# Patient Record
Sex: MALE | Race: WHITE | NOT HISPANIC OR LATINO | Employment: FULL TIME | URBAN - METROPOLITAN AREA
[De-identification: names, ages, dates, MRNs, and addresses within clinical notes are randomized per-mention and may not be internally consistent; named-entity substitution may affect disease eponyms.]

---

## 2018-08-10 ENCOUNTER — APPOINTMENT (OUTPATIENT)
Dept: RADIOLOGY | Facility: MEDICAL CENTER | Age: 28
End: 2018-08-10
Attending: EMERGENCY MEDICINE

## 2018-08-10 ENCOUNTER — HOSPITAL ENCOUNTER (EMERGENCY)
Facility: MEDICAL CENTER | Age: 28
End: 2018-08-10
Attending: EMERGENCY MEDICINE

## 2018-08-10 VITALS
RESPIRATION RATE: 16 BRPM | HEART RATE: 75 BPM | SYSTOLIC BLOOD PRESSURE: 109 MMHG | OXYGEN SATURATION: 97 % | TEMPERATURE: 98.3 F | WEIGHT: 155.2 LBS | DIASTOLIC BLOOD PRESSURE: 69 MMHG

## 2018-08-10 DIAGNOSIS — S30.0XXA CONTUSION OF COCCYX, INITIAL ENCOUNTER: ICD-10-CM

## 2018-08-10 PROCEDURE — 700102 HCHG RX REV CODE 250 W/ 637 OVERRIDE(OP): Performed by: EMERGENCY MEDICINE

## 2018-08-10 PROCEDURE — A9270 NON-COVERED ITEM OR SERVICE: HCPCS | Performed by: EMERGENCY MEDICINE

## 2018-08-10 PROCEDURE — 99283 EMERGENCY DEPT VISIT LOW MDM: CPT

## 2018-08-10 PROCEDURE — 72220 X-RAY EXAM SACRUM TAILBONE: CPT

## 2018-08-10 RX ORDER — IBUPROFEN 600 MG/1
600 TABLET ORAL ONCE
Status: COMPLETED | OUTPATIENT
Start: 2018-08-10 | End: 2018-08-10

## 2018-08-10 RX ORDER — ACETAMINOPHEN 325 MG/1
650 TABLET ORAL ONCE
Status: COMPLETED | OUTPATIENT
Start: 2018-08-10 | End: 2018-08-10

## 2018-08-10 RX ADMIN — IBUPROFEN 600 MG: 600 TABLET, FILM COATED ORAL at 12:35

## 2018-08-10 RX ADMIN — ACETAMINOPHEN 650 MG: 325 TABLET, FILM COATED ORAL at 12:36

## 2018-08-10 NOTE — ED NOTES
Pt provided with discharge instructions, instructions for follow up appointment with Workman's Comp, s/s of when to seek emergency care.  Pt verbalizes understanding.  Pt discharged in good condition.

## 2018-08-10 NOTE — ED PROVIDER NOTES
ED Provider Note    Scribed for Martin Cornejo M.D. by Natalie Sampson. 8/10/2018  12:02 PM    Means of arrival: walk in   History obtained from: patient   History limited by: none     CHIEF COMPLAINT  Chief Complaint   Patient presents with   • T-5000 FALL   • Tailbone Pain       HPI  Juanito Quinones is a 27 y.o. male who presents to the Emergency Department for evaluation of coccyx pain secondary to a fall yesterday. Patient reports falling from a 5 foot ladder yesterday while at work. His pain is exacerbated with palpation. Patient did not come in yesterday as he hoped his pain would resolve on it's own. He has not taken anything for pain control. No complaints of pain with bowel movements and syncope.    REVIEW OF SYSTEMS  Pertinent positives include coccyx pain. Pertinent negatives include no pain with bowel movements, syncope.  E.     PAST MEDICAL HISTORY   No pertinent past medical history.     SURGICAL HISTORY  patient denies any surgical history    SOCIAL HISTORY  Social History   Substance Use Topics   • Smoking status: None noted    • Smokeless tobacco: None noted    • Alcohol use None noted       History   Drug use: Unknown       FAMILY HISTORY  No family history noted    CURRENT MEDICATIONS  Current medications can be reviewed in the nurse's note.     ALLERGIES  No Known Allergies    PHYSICAL EXAM  VITAL SIGNS: /69   Pulse 75   Temp 36.8 °C (98.3 °F)   Resp 16   Wt 70.4 kg (155 lb 3.3 oz)   SpO2 97%     Constitutional: Well developed, Well nourished, no distress, Non-toxic appearance.   HENT: Normocephalic, Atraumatic.  Oropharynx moist.   Eyes: PERRL, EOMI, Conjunctiva normal, No discharge.   Neck: no anterior cervical lymphadenopathy  CV: Good pulses  Thorax & Lungs: No respiratory distress.   Skin: Warm, Dry, No erythema, No rash.    Musculoskeletal: Focal pain to palpation of the sacrum and coccyx. Patient is ambulatory with normal gait.   Neurologic: Awake, alert. Moves all extremities  spontaneously. Patient is ambulatory with normal gait.   Psychiatric: Affect normal, Mood normal.     RADIOLOGY  DX-SACRUM AND COCCYX 2+   Final Result      No displaced fracture of sacrum or coccyx.        The radiologist's interpretation of all radiological studies have been reviewed by me.    COURSE & MEDICAL DECISION MAKING  Pertinent Labs & Imaging studies reviewed. (See chart for details)    12:02 PM - Patient seen and examined at bedside. Patient will be treated with Motrin 600 mg, Tylenol 350 mg. Ordered DX sacrum and Coccyx to evaluate his symptoms.     1:02 PM- Reviewed the patient's imaging results which were negative for fracture.     1:06 PM- Patient was reevaluated at bedside. He is feeling improved. Discussed radiology results with the patient and informed them that he is stable for discharge. Encouraged him to follow up with workman's compensation. Instructed the patient on return to ED precautions and he agrees to be discharged home.      Decision Making:  Fall Kocsis contusion, x-rays negative, will discharge patient home, have the patient follow-up the Worker's Comp., have the patient return with any other concerns      The patient will return for new or worsening symptoms and is stable at the time of discharge.    The patient is referred to a primary physician for blood pressure management, diabetic screening, and for all other preventative health concerns.    DISPOSITION:  Patient will be discharged home in stable condition.    FOLLOW UP:  [facility], Emergency Dept  [address]  [place] [address] [phone]    If symptoms worsen    [facility] [place] [id number]  [address]  [facility] [place] [id number] [phone]        FINAL IMPRESSION  1. Contusion of coccyx, initial encounter        Natalie BLUE (Gloria), am scribing for, and in the presence of, Martin Cornejo M.D..    Electronically signed by: Natalie Sampson (Gloria), 8/10/2018    SU  Martin Cornejo M.D. personally performed the services described in this documentation, as scribed by Natalie Sampson in my presence, and it is both accurate and complete.    The note accurately reflects work and decisions made by me.  Martin Cornejo  8/10/2018  4:28 PM

## 2018-08-10 NOTE — LETTER
FORM C-4:  EMPLOYEE’S CLAIM FOR COMPENSATION/ REPORT OF INITIAL TREATMENT  EMPLOYEE’S CLAIM - PROVIDE ALL INFORMATION REQUESTED   First Name  Juanito Last Name  Joni Birthdate             Age  1990 27 y.o. Sex  male Claim Number   [address] Employee Address  [address]                                     [place]  [address] [place]    Weight  70.4 kg (155 lb 3.3 oz) [facility]     Mailing Employee Address                           [address]   [facility]               Zip  [address] Telephone  [phone] (home)  Primary Language Spoken  ENGLISH   Insurer  [employer] [facility] Third Party   [employer] [facility] Employee's Occupation (Job Title) When Injury or Occupational Disease Occurred     Employer's Name  [employer] Telephone  [phone]    Employer Address  [address] [place] [facility] [place]  [address]   Date of Injury  8/10/2018       Hour of Injury  10:00 AM Date Employer Notified  8/10/2018 Last Day of Work after Injury or Occupational Disease  8/10/2018 Supervisor to Whom Injury Reported  Phong   Address or Location of Accident (if applicable)  [address]   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 Be specific and answer in detail. Use additional sheet if necessary)  I was on top of the ladder when the air hose it and I fell off   If you believe that you have an occupational disease, when did you first have knowledge of the disability and it relationship to your employment?  N/A Witnesses to the Accident  [place]     Nature of Injury or Occupational Disease  Workers' Compensation  Part(s) of Body Injured or Affected  Buttocks, Spinal Cord - Trunk, N/A    I certify that the above is true and correct to the best of my knowledge and that I have provided this information in order to obtain the benefits of [facility]  Industrial Insurance and Occupational Diseases [place]).  I hereby authorize any physician, chiropractor, surgeon, practitioner, or other person, any hospital, including [facility], any medical service organization, any insurance company, or other institution or organization to release to each other, any medical or other information, including benefits paid or payable, pertinent to this injury or disease, except information relative to diagnosis, treatment and/or counseling for AIDS, psychological conditions, alcohol or controlled substances, for which I must give specific authorization.  A Photostat of this authorization shall be as valid as the original.   Date  8/10/18 Place  [facility] Employee’s Signature   THIS REPORT MUST BE COMPLETED AND MAILED WITHIN 3 WORKING DAYS OF TREATMENT   Place  [facility], EMERGENCY DEPT  Name of Facility   [facility]   Date  8/10/2018 Diagnosis  (S30.0XXA) Contusion of coccyx, initial encounter Is there evidence the injured employee was under the influence of alcohol and/or another controlled substance at the time of accident?   Hour  2:02 PM Description of Injury or Disease  Contusion of coccyx, initial encounter No   Treatment  nsaids  Have you advised the patient to remain off work five days or more?         No   X-Ray Findings  Negative   If Yes   From Date    To Date      From information given by the employee, together with medical evidence, can you directly connect this injury or occupational disease as job incurred?  Yes If No, is the employee capable of: Full Duty  No Modified Duty  Yes   Is additional medical care by a physician indicated?  Yes If Modified Duty, Specify any Limitations / Restrictions  No lifting greater than 25 lbs     Do you know of any previous injury or disease contributing to this condition or occupational disease?  No  "  Date  8/10/2018 Print Doctor’s Name  Martin Cornejo I certify the employer’s copy of this form was mailed on:   Address  [address] [address] [phone] Insurer’s Use Only   [facility]  [address]    Provider’s Tax ID Number  [id number] Telephone  Dept: [phone]    Doctor’s Signature  e-MARTIN Carter M.D. Degree   MD    Original - TREATING PHYSICIAN OR CHIROPRACTOR   Pg 2-Insurer/TPA   Pg 3-Employer   Pg 4-Employee                                                                                                  Form C-4 (rev01/03)     BRIEF DESCRIPTION OF RIGHTS AND BENEFITS  (Pursuant to [place] 616[place])    Notice of Injury or Occupational Disease (Incident Report Form C-1): If an injury or occupational disease (OD) arises out of and in the course of employment, you must provide written notice to your employer as soon as practicable, but no later than 7 days after the accident or OD. Your employer shall maintain a sufficient supply of the required forms.    Claim for Compensation (Form C-4): If medical treatment is sought, the form C-4 is available at the place of initial treatment. A completed \"Claim for Compensation\" (Form C-4) must be filed within 90 days after an accident or OD. The treating physician or chiropractor must, within 3 working days after treatment, complete and mail to the employer, the employer's insurer and third-party , the Claim for Compensation.    Medical Treatment: If you require medical treatment for your on-the-job injury or OD, you may be required to select a physician or chiropractor from a list provided by your workers’ compensation insurer, if it has contracted with an Organization for Managed Care (MCO) or Preferred Provider Organization (PPO) or providers of health care. If your employer has not entered into a contract with an MCO or PPO, you may select a physician or chiropractor from the Panel of Physicians and " Chiropractors. Any medical costs related to your industrial injury or OD will be paid by your insurer.    Temporary Total Disability (TTD): If your doctor has certified that you are unable to work for a period of at least 5 consecutive days, or 5 cumulative days in a 20-day period, or places restrictions on you that your employer does not accommodate, you may be entitled to TTD compensation.    Temporary Partial Disability (TPD): If the wage you receive upon reemployment is less than the compensation for TTD to which you are entitled, the insurer may be required to pay you TPD compensation to make up the difference. TPD can only be paid for a maximum of 24 months.    Permanent Partial Disability (PPD): When your medical condition is stable and there is an indication of a PPD as a result of your injury or OD, within 30 days, your insurer must arrange for an evaluation by a rating physician or chiropractor to determine the degree of your PPD. The amount of your PPD award depends on the date of injury, the results of the PPD evaluation and your age and wage.    Permanent Total Disability (PTD): If you are medically certified by a treating physician or chiropractor as permanently and totally disabled and have been granted a PTD status by your insurer, you are entitled to receive monthly benefits not to exceed 66 2/3% of your average monthly wage. The amount of your PTD payments is subject to reduction if you previously received a PPD award.    Vocational Rehabilitation Services: You may be eligible for vocational rehabilitation services if you are unable to return to the job due to a permanent physical impairment or permanent restrictions as a result of your injury or occupational disease.    Transportation and Per Sophia Reimbursement: You may be eligible for travel expenses and per sophia associated with medical treatment.  Reopening: You may be able to reopen your claim if your condition worsens after claim  closure.    Appeal Process: If you disagree with a written determination issued by the insurer or the insurer does not respond to your request, you may appeal to the Department of Administration, , by following the instructions contained in your determination letter. You must appeal the determination within 70 days from the date of the determination letter at [address], [address], [place], [place] [address], or [address], [address], [place], [place] [address]. If you disagree with the  decision, you may appeal to the Department of Administration, . You must file your appeal within 30 days from the date of the  decision letter at [address], [address], [place], [place] [address], or [address], [place], [place] [address]. If you disagree with a decision of an , you may file a petition for judicial review with the District Court. You must do so within 30 days of the Appeal Officer’s decision. You may be represented by an  at your own expense or you may contact the [facility] for possible representation.    [place]  for Injured Workers (NAIW): If you disagree with a  decision, you may request that NAIW represent you without charge at an  Hearing. For information regarding denial of benefits, you may contact the [facility] at: [address], [address], [place] [address], [phone], or [address], [place] [address], [phone]    To File a Complaint with the Division: If you wish to file a complaint with the  of the Division of Industrial Relations (DIR), please contact the Workers’ Compensation Section, [address], [address], [place], [place] [address], telephone [phone], or [address], [place] [address], telephone [phone].    For assistance with  Workers’ Compensation Issues: you may contact the Office of the Governor Consumer Health Assistance, [address] [place] [address], [place] [address], [place] [address], Toll Free 1-696.106.7559, Web site: http://govcha.[facility].nv., E-mail slim@[facility].nv.                                                                                                                                                                               __________________________________________________________________                                    _________________            Employee Name / Signature                                                                                                                            Date                                       D-2 (rev. 10/07)

## 2018-08-10 NOTE — DISCHARGE INSTRUCTIONS
Tailbone Injury  The tailbone is the small bone at the lower end of the backbone (spine). You may have stretched tissues, bruises, or a broken bone (fracture). These injuries can be painful. Most tailbone injuries get better on their own in 4-6 weeks.  Follow these instructions at home:  · Take medicines only as told by your doctor.  · If told, apply ice to the injured area.  ¨ Put ice in a plastic bag.  ¨ Place a towel between your skin and the bag.  ¨ Leave the ice on for 20 minutes, 2-3 times per day. Do this for the first 1-2 days.  · Sit on a large, rubber or inflated ring or cushion to lessen pain. Lean forward when you sit to help lessen pain.  · Avoid sitting in one place for a long time.  · Increase your activity as the pain allows.  · Do exercises as told by your doctor or physical therapist.  · If it is painful to poop, take medicine to help you poop (stool softeners) as told by your doctor.  · Eat foods that have plenty of fiber.  · Keep all follow-up visits as told by your doctor. This is important.  Contact a doctor if:  · Your pain gets worse.  · Pooping causes you pain.  · You cannot poop (constipation).  · You are leaking pee (urinary incontinence).  · You have a fever.  This information is not intended to replace advice given to you by your health care provider. Make sure you discuss any questions you have with your health care provider.  Document Released: 01/20/2012 Document Revised: 08/17/2017 Document Reviewed: 12/14/2015  [employer] Interactive Patient Education © 2017 [employer]